# Patient Record
Sex: FEMALE | Race: WHITE | NOT HISPANIC OR LATINO | Employment: OTHER | URBAN - NONMETROPOLITAN AREA
[De-identification: names, ages, dates, MRNs, and addresses within clinical notes are randomized per-mention and may not be internally consistent; named-entity substitution may affect disease eponyms.]

---

## 2017-09-28 ENCOUNTER — OFFICE VISIT (OUTPATIENT)
Dept: CARDIOLOGY | Facility: PHYSICIAN GROUP | Age: 77
End: 2017-09-28
Payer: MEDICARE

## 2017-09-28 VITALS
BODY MASS INDEX: 25.95 KG/M2 | HEART RATE: 77 BPM | SYSTOLIC BLOOD PRESSURE: 108 MMHG | DIASTOLIC BLOOD PRESSURE: 74 MMHG | OXYGEN SATURATION: 97 % | HEIGHT: 62 IN | WEIGHT: 141 LBS

## 2017-09-28 DIAGNOSIS — E78.2 MIXED HYPERLIPIDEMIA: ICD-10-CM

## 2017-09-28 DIAGNOSIS — I48.20 CHRONIC ATRIAL FIBRILLATION (HCC): ICD-10-CM

## 2017-09-28 DIAGNOSIS — Z79.899 ENCOUNTER FOR LONG-TERM CURRENT USE OF HIGH RISK MEDICATION: ICD-10-CM

## 2017-09-28 DIAGNOSIS — R47.01 EXPRESSIVE APHASIA: ICD-10-CM

## 2017-09-28 DIAGNOSIS — I10 ESSENTIAL HYPERTENSION: ICD-10-CM

## 2017-09-28 DIAGNOSIS — C50.919 MALIGNANT NEOPLASM OF FEMALE BREAST, UNSPECIFIED LATERALITY, UNSPECIFIED SITE OF BREAST: ICD-10-CM

## 2017-09-28 DIAGNOSIS — I63.9 CEREBROVASCULAR ACCIDENT (CVA), UNSPECIFIED MECHANISM (HCC): ICD-10-CM

## 2017-09-28 DIAGNOSIS — Z79.01 ON CONTINUOUS ORAL ANTICOAGULATION: ICD-10-CM

## 2017-09-28 PROCEDURE — 99205 OFFICE O/P NEW HI 60 MIN: CPT | Performed by: INTERNAL MEDICINE

## 2017-09-28 RX ORDER — AMLODIPINE BESYLATE 10 MG/1
10 TABLET ORAL DAILY
COMMUNITY

## 2017-09-28 RX ORDER — WARFARIN SODIUM 2 MG/1
2 TABLET ORAL DAILY
COMMUNITY
End: 2017-10-10 | Stop reason: SDUPTHER

## 2017-09-28 RX ORDER — ATORVASTATIN CALCIUM 40 MG/1
40 TABLET, FILM COATED ORAL NIGHTLY
COMMUNITY

## 2017-09-28 NOTE — PROGRESS NOTES
Subjective:   So Carroll is a 76 y.o. female who presents today for initial consultation. Has atrial fibrillation s/p CVA on OAC with warfarin, breast cancer s/p right lumpectomy and pending radiation therapy, HTN, HLP, and expressive aphasia.    No symptoms or complaints. With her niece. Recently moved to [facility], has seen her new PCP but he did not initiate coumadin monitoring and she has not had a check in about a month. No echo available.    Denies any other cardiovascular symptoms including chest pain, shortness of breath, dyspnea on exertion, lightheadedness, syncope or presyncope, lower extremity edema, PND, orthopnea or palpitations.      Past Medical History:   Diagnosis Date   • Atrial fibrillation (CMS-HCC)    • Breast cancer (CMS-HCC)    • CVA (cerebral vascular accident) (CMS-HCC)    • Encounter for long-term current use of high risk medication    • Essential hypertension    • Expressive aphasia    • Hyperlipidemia    • On continuous oral anticoagulation      Past Surgical History:   Procedure Laterality Date   • LUMPECTOMY       Family History   Problem Relation Age of Onset   • Heart Disease Neg Hx      History   Smoking Status   • Former Smoker   Smokeless Tobacco   • Never Used     No Known Allergies  Outpatient Encounter Prescriptions as of 9/28/2017   Medication Sig Dispense Refill   • sertraline (ZOLOFT) 50 MG Tab Take 50 mg by mouth every day.     • Garlic 1000 MG Cap Take  by mouth.     • amlodipine (NORVASC) 10 MG Tab Take 10 mg by mouth every day.     • atorvastatin (LIPITOR) 40 MG Tab Take 40 mg by mouth every evening.     • warfarin (COUMADIN) 2 MG Tab Take 2 mg by mouth every day.     • Cholecalciferol (D3-1000) 1000 UNIT Cap Take  by mouth.     • metoprolol (LOPRESSOR) 25 MG Tab Take 1 Tab by mouth 2 times a day. 60 Tab 11     No facility-administered encounter medications on file as of 9/28/2017.      Review of Systems   All other systems reviewed and are negative.       Objective:  "  /74   Pulse 77   Ht 1.575 m (5' 2\")   Wt 64 kg (141 lb)   SpO2 97%   BMI 25.79 kg/m²     Physical Exam   Constitutional: She is oriented to person, place, and time. She appears well-developed and well-nourished. No distress.   HENT:   Head: Normocephalic and atraumatic.   Mouth/Throat: Oropharynx is clear and moist. No oropharyngeal exudate.   Eyes: Conjunctivae are normal. Pupils are equal, round, and reactive to light. No scleral icterus.   Neck: Normal range of motion. Neck supple. No JVD present. No thyromegaly present.   Cardiovascular: Normal rate, normal heart sounds and intact distal pulses.  An irregularly irregular rhythm present. Exam reveals no gallop and no friction rub.    No murmur heard.  Pulses:       Carotid pulses are 2+ on the right side, and 2+ on the left side.       Radial pulses are 2+ on the right side, and 2+ on the left side.        Popliteal pulses are 2+ on the right side, and 2+ on the left side.        Dorsalis pedis pulses are 2+ on the right side, and 2+ on the left side.        Posterior tibial pulses are 2+ on the right side, and 2+ on the left side.   Pulmonary/Chest: Effort normal and breath sounds normal. She has no wheezes. She has no rales.   Abdominal: Soft. Bowel sounds are normal. She exhibits no distension. There is no tenderness.   Musculoskeletal: She exhibits no edema or tenderness.   Neurological: She is alert and oriented to person, place, and time. No cranial nerve deficit (Cranial nerves II through XII grossly intact).   Expressive aphasia   Skin: Skin is warm and dry. No rash noted. She is not diaphoretic. No erythema.   Psychiatric: She has a normal mood and affect. Her behavior is normal.   Vitals reviewed.    EKG (9/28/2017):  I have personally reviewed the EKG this visit and discussed with the patient. It shows AF91.    Assessment:     1. Chronic atrial fibrillation (CMS-HCC)  metoprolol (LOPRESSOR) 25 MG Tab    REFERRAL TO ANTICOAGULATION " MONITORING    ECHOCARDIOGRAM COMP W/O CONT   2. Cerebrovascular accident (CVA), unspecified mechanism (CMS-HCC)     3. On continuous oral anticoagulation     4. Malignant neoplasm of female breast, unspecified laterality, unspecified site of breast (CMS-HCC)     5. Essential hypertension     6. Mixed hyperlipidemia     7. Encounter for long-term current use of high risk medication     8. Expressive aphasia         Medical Decision Making:  Today's Assessment / Status / Plan:     Doing well. No symptoms. CVA and AF, relatively rate controlled. Recommend BB and Echocardiogram.    1. Echo.  2. Lopressor 25mg BID.  3. Coumadin clinic referral and INR today.    FU one year or sooner if testing abnormal.

## 2017-10-02 ENCOUNTER — ANTICOAGULATION MONITORING (OUTPATIENT)
Dept: VASCULAR LAB | Facility: MEDICAL CENTER | Age: 77
End: 2017-10-02

## 2017-10-02 DIAGNOSIS — I48.20 CHRONIC ATRIAL FIBRILLATION (HCC): ICD-10-CM

## 2017-10-02 DIAGNOSIS — Z79.01 ON CONTINUOUS ORAL ANTICOAGULATION: ICD-10-CM

## 2017-10-02 DIAGNOSIS — I63.9 CEREBROVASCULAR ACCIDENT (CVA), UNSPECIFIED MECHANISM (HCC): ICD-10-CM

## 2017-10-02 LAB — INR PPP: 2.5 (ref 2–3.5)

## 2017-10-02 NOTE — PROGRESS NOTES
Unable to dose pt today. LM on VM to call back for instructions and to let us know what dose she is taking. Her med rec says 2 mg but not sure if she is taking that daily or a different dose.  Provided a phone number for the clinic.   CHARLENE Morelos.

## 2017-10-03 ENCOUNTER — ANTICOAGULATION MONITORING (OUTPATIENT)
Dept: VASCULAR LAB | Facility: MEDICAL CENTER | Age: 77
End: 2017-10-03

## 2017-10-03 VITALS — HEART RATE: 73 BPM | SYSTOLIC BLOOD PRESSURE: 100 MMHG | OXYGEN SATURATION: 94 % | DIASTOLIC BLOOD PRESSURE: 64 MMHG

## 2017-10-03 DIAGNOSIS — Z79.01 ON CONTINUOUS ORAL ANTICOAGULATION: ICD-10-CM

## 2017-10-03 DIAGNOSIS — I48.20 CHRONIC ATRIAL FIBRILLATION (HCC): ICD-10-CM

## 2017-10-03 DIAGNOSIS — I63.9 CEREBROVASCULAR ACCIDENT (CVA), UNSPECIFIED MECHANISM (HCC): ICD-10-CM

## 2017-10-03 LAB — INR PPP: 3.3 (ref 2–3.5)

## 2017-10-03 NOTE — PROGRESS NOTES
OP Anticoagulation Service Note    Date: 10/3/2017  Blood Pressure : 100/64  Pulse: 73    Anticoagulation Summary  As of 10/3/2017    INR goal:   2.0-3.0   TTR:   --   Today's INR:   3.3!   Maintenance plan:   3 mg (2 mg x 1.5) on Mon, Wed, Fri; 2 mg (2 mg x 1) all other days   Weekly total:   17 mg   Plan last modified:   TARIQ Morelos (10/3/2017)   Next INR check:   10/10/2017   Target end date:   Indefinite    Indications    Atrial fibrillation (CMS-[facility]) [I48.91]  Stroke (CMS-[facility]) [I63.9] [I63.9]  On continuous oral anticoagulation [Z79.01]             Anticoagulation Episode Summary     INR check location:       Preferred lab:       Send INR reminders to:       Comments:         Anticoagulation Care Providers     Provider Role Specialty Phone number    Gio Garcia M.D. Referring Interventional Cardiology [phone]    Bradley Ames M.D. Responsible Internal Medicine [phone]    [facility] Anticoagulation Services Responsible  [phone]        Anticoagulation Patient Findings      THIS VISIT CONDUCTED WITH PRESENTER VIA TELEMEDICINE UTILIZING SECURE AND ENCRYPTED VIDEOCONFERENCING EQUIPMENT  ROS:    Pulm: Denies SOB, chest pain.    Card: Denies syncope, edema, palpitations.    Extremities: Denies redness, pain.    PE:    Pulm: No SOB, even and unlabored.    Card: Normal rate and rhythm.   Extremities: No redness, or edema.     INR  supra-therapeutic.    Denies signs/symptoms of bleeding and/or thrombosis.    Denies changes to diet or medications.   Follow up appt in 1 weeks     Plan:  Take 1/2 tablet tonight then back to your usual dose.     Medication: Warfarin (Coumadin)     Sunday Monday Tuesday Wednesday Thursday Friday Saturday      2 mg    3 mg    2 mg    3 mg    2 mg    3 mg    2 mg      1 tab(s)    1.5 tab(s)    1 tab(s)    1.5 tab(s)    1 tab(s)    1.5 tab(s)  1 tab(s)     Next Appointment: Tuesday, October 10 @3:00    Review all of your home medications and newly  ordered medications with your doctor and / or pharmacist. Follow medication instructions as directed by your doctor and / or pharmacist. Please keep your medication list with you and share with your physician. Update the information when medications are discontinued, doses are changed, or new medications (including over-the-counter products) are added; and carry medication information at all times in the event of emergency situations.      For questions, please contact Outpatient Anticoagulation Service [phone].     CHARLENE Morelos.

## 2017-10-06 ENCOUNTER — OFFICE VISIT (OUTPATIENT)
Dept: NEUROLOGY | Facility: MEDICAL CENTER | Age: 77
End: 2017-10-06
Payer: MEDICARE

## 2017-10-06 VITALS
DIASTOLIC BLOOD PRESSURE: 58 MMHG | HEIGHT: 62 IN | RESPIRATION RATE: 16 BRPM | WEIGHT: 141.7 LBS | TEMPERATURE: 97.9 F | HEART RATE: 59 BPM | OXYGEN SATURATION: 91 % | BODY MASS INDEX: 26.07 KG/M2 | SYSTOLIC BLOOD PRESSURE: 100 MMHG

## 2017-10-06 DIAGNOSIS — I63.00 CEREBROVASCULAR ACCIDENT (CVA) DUE TO THROMBOSIS OF PRECEREBRAL ARTERY (HCC): ICD-10-CM

## 2017-10-06 DIAGNOSIS — I48.91 ATRIAL FIBRILLATION, UNSPECIFIED TYPE (HCC): ICD-10-CM

## 2017-10-06 PROCEDURE — 99204 OFFICE O/P NEW MOD 45 MIN: CPT | Performed by: PSYCHIATRY & NEUROLOGY

## 2017-10-06 ASSESSMENT — PATIENT HEALTH QUESTIONNAIRE - PHQ9: CLINICAL INTERPRETATION OF PHQ2 SCORE: 0

## 2017-10-06 NOTE — PROGRESS NOTES
NEUROLOGY NOTE    Referring Physician  Bradley Ames M.D.      CHIEF COMPLAINT:  2 years ago, ICH ( brain aneurysm?) in [place]  Hx of breast carcinoma  Hx of atrial  Chief Complaint   Patient presents with   • Establish Care     TIA       PRESENT ILLNESS:   The patient was referred to  [facility] with Hx of possible stroke in [place]    She just moved into     PAST MEDICAL HISTORY:  Past Medical History:   Diagnosis Date   • Atrial fibrillation (CMS-HCC)    • Breast cancer (CMS-HCC)    • CVA (cerebral vascular accident) (CMS-HCC)    • Encounter for long-term current use of high risk medication    • Essential hypertension    • Expressive aphasia    • Hyperlipidemia    • On continuous oral anticoagulation        PAST SURGICAL HISTORY:  Past Surgical History:   Procedure Laterality Date   • LUMPECTOMY         FAMILY HISTORY:  Family History   Problem Relation Age of Onset   • Heart Disease Neg Hx        SOCIAL HISTORY:  Social History     Social History   • Marital status: Unknown     Spouse name: N/A   • Number of children: N/A   • Years of education: N/A     Occupational History   • Not on file.     Social History Main Topics   • Smoking status: Former Smoker   • Smokeless tobacco: Never Used   • Alcohol use No   • Drug use: No   • Sexual activity: Not on file     Other Topics Concern   • Not on file     Social History Narrative   • No narrative on file     ALLERGIES:  No Known Allergies  TOBHX  History   Smoking Status   • Former Smoker   Smokeless Tobacco   • Never Used     ALCHX  History   Alcohol Use No     DRUGHX  History   Drug Use No           MEDICATIONS:  Current Outpatient Prescriptions   Medication   • sertraline (ZOLOFT) 50 MG Tab   • Garlic 1000 MG Cap   • amlodipine (NORVASC) 10 MG Tab   • atorvastatin (LIPITOR) 40 MG Tab   • warfarin (COUMADIN) 2 MG Tab   • Cholecalciferol (D3-1000) 1000 UNIT Cap   • metoprolol (LOPRESSOR) 25 MG Tab     No current facility-administered medications for this visit.   "      REVIEW OF SYSTEM:    Constitutional: Denies fevers, Denies weight changes   Eyes: Denies changes in vision, no eye pain   Ears/Nose/Throat/Mouth: Denies nasal congestion or sore throat   Cardiovascular: atrial fib   Respiratory: Denies SOB.   Gastrointestinal/Hepatic: Denies abdominal pain, nausea, vomiting, diarrhea, constipation or GI bleeding   Genitourinary: Denies bladder dysfunction, dysuria or frequency   Musculoskeletal/Rheum: Denies joint pain and swelling   Skin/Breast: Denies rash, denies breast lumps or discharge   Neurological: aphasia,   Psychiatric: denies mood disorder   Endocrine: denies hx of diabetes or thyroid dysfunction   Heme/Oncology/Lymph Nodes: Denies enlarged lymph nodes, denies brusing or known bleeding disorder   Allergic/Immunologic: Denies hx of allergies         PHYSICAL AND NEUROLOGICAL EXMAINATIONS:  VITAL SIGNS: /58   Pulse (!) 59   Temp 36.6 °C (97.9 °F)   Resp 16   Ht 1.575 m (5' 2\")   Wt 64.3 kg (141 lb 11.2 oz)   SpO2 91%   BMI 25.92 kg/m²   CURRENT WEIGHT:   BMI: Body mass index is 25.92 kg/m².  PREVIOUS WEIGHTS:  Wt Readings from Last 25 Encounters:   10/06/17 64.3 kg (141 lb 11.2 oz)   09/28/17 64 kg (141 lb)       General appearance of patient: WDWN(+) NAD(+)    EYES  o Fundus : Papilledem(-) Exudates(-) Hemorrhage(-)  Nervous System  Orientation to time, place and person(-)  Memory normal(-)  Language: aphasia(+)-- comprehension, expression and repetition all impaired, more impaired in comprehension  Knowledge: past(-) Current(-)  Attention(+)  Cranial Nerves  • Nerve 2: intact  • Nerve 3,4,6: intact  • Nerve 5 : intact  • Nerve 7: intact  • Nerve 8: intact  • Nerve 9 & 10: intact  • Nerve 11: intact  • Nerve 12: intact  Muscle Power and muscle tone: symmetric, normal in upper and lower  Sensory System: Pin sensation intact(+)  Reflexes: symmetric increased in the legs R>L  Cerebellar Function FNP normal   Gait : Steady(+) TandemGait steady(+)  Heart and " Vascular  Peripheral Vasucular system : Edema (-) Swelling(-)  RHB, Breathing sound clear  abdomen bowel sound normoactive  Extremities freely moveable  Joints no contracture       LAB:            IMPRESSION:    1. Aphasia for 2 years-- extensive embolic stroke?-- happened 2 years ago in [place]  2. Hx of atrial fib, breast carcinoma      PLAN/RECOMMENDATIONS:    Will get MRI of brain  Explain to the patient and her family member that we will need imaging test first       SIGNATURE:  Leonides Dove    CC:  Bradley Ames M.D.

## 2017-10-06 NOTE — PATIENT INSTRUCTIONS
IMPRESSION:    1. Aphasia for 2 years-- extensive embolic stroke?-- happened 2 years ago in [place]  2. Hx of atrial fib, breast carcinoma      PLAN/RECOMMENDATIONS:    Will get MRI of brain  Explain to the patient and her family member that we will need imaging test first       SIGNATURE:  Leonides Dove    CC:  Bradley Ames M.D.

## 2017-10-10 ENCOUNTER — APPOINTMENT (OUTPATIENT)
Dept: VASCULAR LAB | Facility: MEDICAL CENTER | Age: 77
End: 2017-10-10
Payer: MEDICARE

## 2017-10-10 ENCOUNTER — HOSPITAL ENCOUNTER (OUTPATIENT)
Dept: RADIOLOGY | Facility: MEDICAL CENTER | Age: 77
End: 2017-10-10
Attending: PSYCHIATRY & NEUROLOGY
Payer: MEDICARE

## 2017-10-10 ENCOUNTER — ANTICOAGULATION MONITORING (OUTPATIENT)
Dept: VASCULAR LAB | Facility: MEDICAL CENTER | Age: 77
End: 2017-10-10

## 2017-10-10 VITALS — HEART RATE: 69 BPM | OXYGEN SATURATION: 95 % | SYSTOLIC BLOOD PRESSURE: 92 MMHG | DIASTOLIC BLOOD PRESSURE: 70 MMHG

## 2017-10-10 DIAGNOSIS — I48.91 ATRIAL FIBRILLATION, UNSPECIFIED TYPE (HCC): ICD-10-CM

## 2017-10-10 DIAGNOSIS — I48.20 CHRONIC ATRIAL FIBRILLATION (HCC): ICD-10-CM

## 2017-10-10 DIAGNOSIS — I63.9 CEREBROVASCULAR ACCIDENT (CVA), UNSPECIFIED MECHANISM (HCC): ICD-10-CM

## 2017-10-10 DIAGNOSIS — I63.00 CEREBROVASCULAR ACCIDENT (CVA) DUE TO THROMBOSIS OF PRECEREBRAL ARTERY (HCC): ICD-10-CM

## 2017-10-10 DIAGNOSIS — Z79.01 ON CONTINUOUS ORAL ANTICOAGULATION: ICD-10-CM

## 2017-10-10 LAB
INR PPP: 3 (ref 2–3.5)
INR PPP: 4.6 (ref 2–3.5)

## 2017-10-10 PROCEDURE — 70551 MRI BRAIN STEM W/O DYE: CPT

## 2017-10-10 RX ORDER — WARFARIN SODIUM 2 MG/1
TABLET ORAL
Qty: 45 TAB | Refills: 11 | Status: SHIPPED | OUTPATIENT
Start: 2017-10-10

## 2017-10-10 NOTE — PROGRESS NOTES
OP Anticoagulation Service Note    Date: 10/10/2017  Blood Pressure : 92/70  Pulse:69    Anticoagulation Summary  As of 10/10/2017    INR goal:   2.0-3.0   TTR:   --   Today's INR:   3.0   Maintenance plan:   3 mg (2 mg x 1.5) on Mon, Fri; 2 mg (2 mg x 1) all other days   Weekly total:   16 mg   Plan last modified:   TARIQ Morelos (10/10/2017)   Next INR check:   10/17/2017   Target end date:   Indefinite    Indications    Atrial fibrillation (CMS-[facility]) [I48.91]  Stroke (CMS-[facility]) [I63.9] [I63.9]  On continuous oral anticoagulation [Z79.01]             Anticoagulation Episode Summary     INR check location:       Preferred lab:       Send INR reminders to:       Comments:         Anticoagulation Care Providers     Provider Role Specialty Phone number    Gio Garcia M.D. Referring Interventional Cardiology [phone]    Bradley Ames M.D. Responsible Internal Medicine [phone]    [facility] Anticoagulation Services Responsible  [phone]        Anticoagulation Patient Findings      THIS VISIT CONDUCTED WITH PRESENTER VIA TELEMEDICINE UTILIZING SECURE AND ENCRYPTED VIDEOCONFERENCING EQUIPMENT  ROS:    Pulm: Denies SOB, chest pain.    Card: Denies syncope, edema, palpitations.    Extremities: Denies redness, pain.    PE:    Pulm: No SOB, even and unlabored.    Card: Normal rate and rhythm.   Extremities: No redness, or edema.     INR  supra-therapeutic.    Denies signs/symptoms of bleeding and/or thrombosis.    Denies changes to diet or medications.   Follow up appt in 1 weeks     Plan:  Take 3 mg on Monday and Thursday and 2 mg the rest of the week.      Medication: Warfarin (Coumadin)     Sunday Monday Tuesday Wednesday Thursday Friday Saturday      2 mg    3 mg    2 mg    2 mg   3 mg    2 mg    2 mg      1 tab(s)    1.5 tab(s)    1 tab(s)    1 tab(s)    1.5 tab(s)    1 tab(s)  1 tab(s)     Next Appointment: Tuesday,Oct 17 @ 4:45  Review all of your home medications and newly  ordered medications with your doctor and / or pharmacist. Follow medication instructions as directed by your doctor and / or pharmacist. Please keep your medication list with you and share with your physician. Update the information when medications are discontinued, doses are changed, or new medications (including over-the-counter products) are added; and carry medication information at all times in the event of emergency situations.      For questions, please contact Outpatient Anticoagulation Service [phone].     CHARLENE Morelos.

## 2017-10-12 ENCOUNTER — TELEPHONE (OUTPATIENT)
Dept: CARDIOLOGY | Facility: MEDICAL CENTER | Age: 77
End: 2017-10-12

## 2017-10-12 NOTE — TELEPHONE ENCOUNTER
Spoke with pt's Niece (Belen), per Belen she is authorized to received information and she is also listed as  on pt's chart, discussed Echo results per TW, Belen verbalizes understanding

## 2017-10-12 NOTE — TELEPHONE ENCOUNTER
----- Message from Gio Garcia M.D. sent at 10/11/2017  8:35 AM PDT -----  Echocardiogram looks excellent. No changes recommended.  Please let them know.

## 2017-12-12 ENCOUNTER — APPOINTMENT (OUTPATIENT)
Dept: VASCULAR LAB | Facility: MEDICAL CENTER | Age: 77
End: 2017-12-12
Payer: MEDICARE

## 2017-12-12 ENCOUNTER — ANTICOAGULATION MONITORING (OUTPATIENT)
Dept: VASCULAR LAB | Facility: MEDICAL CENTER | Age: 77
End: 2017-12-12
Payer: MEDICARE

## 2017-12-12 ENCOUNTER — NON-PROVIDER VISIT (OUTPATIENT)
Dept: MEDICAL GROUP | Facility: PHYSICIAN GROUP | Age: 77
End: 2017-12-12
Payer: MEDICARE

## 2017-12-12 VITALS
SYSTOLIC BLOOD PRESSURE: 96 MMHG | HEART RATE: 69 BPM | OXYGEN SATURATION: 96 % | RESPIRATION RATE: 16 BRPM | DIASTOLIC BLOOD PRESSURE: 60 MMHG

## 2017-12-12 DIAGNOSIS — I48.91 ATRIAL FIBRILLATION, UNSPECIFIED TYPE (HCC): ICD-10-CM

## 2017-12-12 DIAGNOSIS — I63.89 CEREBROVASCULAR ACCIDENT (CVA) DUE TO OTHER MECHANISM (HCC): ICD-10-CM

## 2017-12-12 DIAGNOSIS — Z79.01 ON CONTINUOUS ORAL ANTICOAGULATION: ICD-10-CM

## 2017-12-12 DIAGNOSIS — D68.9 COAGULATION DISORDER (HCC): ICD-10-CM

## 2017-12-12 LAB
INR BLD: 6.9 (ref 0.9–1.2)
INR PPP: 6.9 (ref 2–3.5)
POC PROTIME: ABNORMAL

## 2017-12-12 PROCEDURE — 99212 OFFICE O/P EST SF 10 MIN: CPT | Mod: GT | Performed by: NURSE PRACTITIONER

## 2017-12-12 PROCEDURE — 85610 PROTHROMBIN TIME: CPT | Performed by: PHYSICIAN ASSISTANT

## 2017-12-12 NOTE — PROGRESS NOTES
OP Anticoagulation Service Note    Date: 12/12/2017  Blood Pressure : (!) 96/60  Pulse: 69  Respiration: 16    Anticoagulation Summary  As of 12/12/2017    INR goal:   2.0-3.0   TTR:   0.0 % (2 mo)   Today's INR:   6.9!   Maintenance plan:   3 mg (2 mg x 1.5) on Mon, Fri; 2 mg (2 mg x 1) all other days   Weekly total:   16 mg   Plan last modified:   TARIQ Morelos (10/10/2017)   Next INR check:      Target end date:   Indefinite    Indications    Atrial fibrillation (CMS-[facility]) [I48.91]  Stroke (CMS-[facility]) [I63.9] [I63.9]  On continuous oral anticoagulation [Z79.01]             Anticoagulation Episode Summary     INR check location:       Preferred lab:       Send INR reminders to:       Comments:         Anticoagulation Care Providers     Provider Role Specialty Phone number    Gio Garcia M.D. Referring Interventional Cardiology [phone]    Bradley Ames M.D. Responsible Internal Medicine [phone]    [facility] Anticoagulation Services Responsible  [phone]        Anticoagulation Patient Findings      THIS VISIT CONDUCTED WITH PRESENTER VIA TELEMEDICINE UTILIZING SECURE AND ENCRYPTED VIDEOCONFERENCING EQUIPMENT  ROS:    Pulm: Denies SOB, chest pain.    Card: Denies syncope, edema, palpitations.    Extremities: Denies redness, pain.    PE:    Pulm: No SOB, even and unlabored.    Card: Normal rate and rhythm.   Extremities: No redness, or edema.     INR  supra-therapeutic.    Denies signs/symptoms of bleeding and/or thrombosis.    Denies changes to diet or medications.   Follow up appt in 1 weeks     Plan:  Hold dose tonight and tomorrow night    Medication: Warfarin (Coumadin)     Sunday Monday Tuesday Wednesday Thursday Friday Saturday      2 mg    3 mg    0 mg    0 mg    2 mg    2 mg    2 mg      1 tab(s)    1.5 tab(s)    0 tab(s)    0 tab(s)    1 tab(s)    1 tab(s)  1 tab(s)     Next Appointment: Tuesday, Dec 19 @ 2:15    Review all of your home medications and newly ordered  medications with your doctor and / or pharmacist. Follow medication instructions as directed by your doctor and / or pharmacist. Please keep your medication list with you and share with your physician. Update the information when medications are discontinued, doses are changed, or new medications (including over-the-counter products) are added; and carry medication information at all times in the event of emergency situations.      For questions, please contact Outpatient Anticoagulation Service [phone].       The patient is on a high risk medication and is supra-therapeudic. This increases the patients risk of bleeding and therefore requires frequent monitoring and follow up.     The patient will go to the ER for falls with a head injury,  blood in urine or stool or any bleeding that last longer than 20 min.          CHARLENE Morelos.

## 2018-08-22 ENCOUNTER — ANTICOAGULATION MONITORING (OUTPATIENT)
Dept: VASCULAR LAB | Facility: MEDICAL CENTER | Age: 78
End: 2018-08-22

## 2018-08-22 DIAGNOSIS — I48.91 ATRIAL FIBRILLATION, UNSPECIFIED TYPE (HCC): ICD-10-CM

## 2018-08-22 DIAGNOSIS — I63.89 CEREBROVASCULAR ACCIDENT (CVA) DUE TO OTHER MECHANISM (HCC): ICD-10-CM

## 2018-08-22 DIAGNOSIS — Z79.01 ON CONTINUOUS ORAL ANTICOAGULATION: ICD-10-CM

## 2018-08-22 LAB — INR PPP: 1.9 (ref 2–3.5)

## 2018-08-23 NOTE — PROGRESS NOTES
Received INR results of 1.9 from Dayne .  Have not had contact with patient since 12-.  LM for patient to contact clinic to discuss her current state and warfarin before making any recommendations.  Zheng France, AngelinaD

## 2018-08-23 NOTE — PROGRESS NOTES
Anticoagulation Summary  As of 8/22/2018    INR goal:   2.0-3.0   TTR:   16.1 % (10.5 mo)   Today's INR:   1.9!   Warfarin maintenance plan:   2 mg (2 mg x 1) on Mon, Thu; 1 mg (2 mg x 0.5) all other days   Weekly warfarin total:   9 mg   Plan last modified:   Nikko Moran PharmD (8/23/2018)   Next INR check:   8/27/2018   Target end date:   Indefinite    Indications    Atrial fibrillation (HCC) [I48.91]  Stroke (CMS-HCC) [I63.9] [I63.9]  On continuous oral anticoagulation [Z79.01]             Anticoagulation Episode Summary     INR check location:       Preferred lab:       Send INR reminders to:       Comments:         Anticoagulation Care Providers     Provider Role Specialty Phone number    Gio Garcia M.D. Referring Interventional Cardiology [phone]    Bradley Ames M.D. Responsible Internal Medicine [phone]    [facility] Anticoagulation Services Responsible  [phone]        Anticoagulation Patient Findings    Received message from Day[facility] NICHOLAS pt is taking the warfarin as shown above, dosing calendar was updated.  Still unable to reach pt but left a voicemail.  Left voicemail message to report a SUB therapeutic INR of 1.9.  Pt to bolus today then continue with current warfarin dosing regimen. Requested pt contact the clinic for any s/s of unusual bleeding, bruising, clotting or any changes to diet or medication. FU 4 days.     Nikko K. Dent, PharmD   [facility]

## 2018-08-29 ENCOUNTER — ANTICOAGULATION MONITORING (OUTPATIENT)
Dept: VASCULAR LAB | Facility: MEDICAL CENTER | Age: 78
End: 2018-08-29

## 2018-08-29 DIAGNOSIS — Z79.01 ON CONTINUOUS ORAL ANTICOAGULATION: ICD-10-CM

## 2018-08-29 LAB — INR PPP: 1.5 (ref 2–3.5)

## 2018-08-29 NOTE — PROGRESS NOTES
Anticoagulation Summary  As of 8/29/2018    INR goal:   2.0-3.0   TTR:   15.8 % (10.7 mo)   Today's INR:   1.5!   Warfarin maintenance plan:   2 mg (2 mg x 1) on Mon, Thu; 1 mg (2 mg x 0.5) all other days   Weekly warfarin total:   9 mg   Plan last modified:   Angelina MaiD (8/23/2018)   Next INR check:   8/31/2018   Target end date:   Indefinite    Indications    Atrial fibrillation (HCC) [I48.91]  Stroke (CMS-HCC) [I63.9] [I63.9]  On continuous oral anticoagulation [Z79.01]             Anticoagulation Episode Summary     INR check location:       Preferred lab:       Send INR reminders to:       Comments:         Anticoagulation Care Providers     Provider Role Specialty Phone number    Gio Garcia M.D. Referring Interventional Cardiology [phone]    Bradley Ames M.D. Responsible Internal Medicine [phone]    [facility] Anticoagulation Services Responsible  [phone]        Anticoagulation Patient Findings      Left voicemail message to report a SUB therapeutic INR of 1.5.    Will have pt a boost dose of 2mg dose of warfarin today and then 3mg boost dose of warfarin tomorrow.    Pt to continue with current warfarin dosing regimen. Requested pt contact the clinic for any s/s of unusual bleeding, bruising, clotting or any changes to diet or medication.    FU INR in 2 days with Dayne Reyes, PharmD

## 2018-08-31 ENCOUNTER — ANTICOAGULATION MONITORING (OUTPATIENT)
Dept: VASCULAR LAB | Facility: MEDICAL CENTER | Age: 78
End: 2018-08-31

## 2018-08-31 DIAGNOSIS — Z79.01 ON CONTINUOUS ORAL ANTICOAGULATION: ICD-10-CM

## 2018-08-31 LAB — INR PPP: 1.9 (ref 2–3.5)

## 2018-08-31 NOTE — PROGRESS NOTES
Anticoagulation Summary  As of 8/31/2018    INR goal:   2.0-3.0   TTR:   15.7 % (10.8 mo)   Today's INR:   1.9!   Warfarin maintenance plan:   2 mg (2 mg x 1) every day   Weekly warfarin total:   14 mg   Plan last modified:   Nikko Moran PharmD (8/31/2018)   Next INR check:   9/4/2018   Target end date:   Indefinite    Indications    Atrial fibrillation (HCC) [I48.91]  Stroke (CMS-HCC) [I63.9] [I63.9]  On continuous oral anticoagulation [Z79.01]             Anticoagulation Episode Summary     INR check location:       Preferred lab:       Send INR reminders to:       Comments:   [facility] [phone]      Anticoagulation Care Providers     Provider Role Specialty Phone number    Gio Garcia M.D. Referring Interventional Cardiology [phone]    Bradley Ames M.D. Responsible Internal Medicine [phone]    [facility] Anticoagulation Services Responsible  [phone]        Anticoagulation Patient Findings    Left voicemail message to report a SUB therapeutic INR of 1.9.  Pt to begin increased warfarin dosing regimen. Requested pt contact the clinic for any s/s of unusual bleeding, bruising, clotting or any changes to diet or medication. FU 4 days.     Nikko Moran, PharmD   Faxed to [facility]

## 2018-09-05 ENCOUNTER — ANTICOAGULATION MONITORING (OUTPATIENT)
Dept: VASCULAR LAB | Facility: MEDICAL CENTER | Age: 78
End: 2018-09-05

## 2018-09-05 DIAGNOSIS — Z79.01 ON CONTINUOUS ORAL ANTICOAGULATION: ICD-10-CM

## 2018-09-05 LAB — INR PPP: 1.7 (ref 2–3.5)

## 2018-09-06 NOTE — PROGRESS NOTES
Anticoagulation Summary  As of 9/5/2018    INR goal:   2.0-3.0   TTR:   15.4 % (10.9 mo)   Today's INR:   1.7!   Warfarin maintenance plan:   3 mg (2 mg x 1.5) on Wed, Sat; 2 mg (2 mg x 1) all other days   Weekly warfarin total:   16 mg   Plan last modified:   Mariajose Reyes, PharmD (9/5/2018)   Next INR check:   9/10/2018   Target end date:   Indefinite    Indications    Atrial fibrillation (HCC) [I48.91]  Stroke (CMS-HCC) [I63.9] [I63.9]  On continuous oral anticoagulation [Z79.01]             Anticoagulation Episode Summary     INR check location:       Preferred lab:       Send INR reminders to:       Comments:   [facility] [phone]      Anticoagulation Care Providers     Provider Role Specialty Phone number    Gio Garcia M.D. Referring Interventional Cardiology [phone]    Bradley Ames M.D. Responsible Internal Medicine [phone]    [facility] Anticoagulation Services Responsible  [phone]        Anticoagulation Patient Findings      Left voicemail message to report a SUB therapeutic INR of 1.7.    Will have pt start an increased warfarin dosing regimen. Requested pt contact the clinic for any s/s of unusual bleeding, bruising, clotting or any changes to diet or medication.    FU INR in 1 week(s) with Dayne NICHOLAS.    Mariajose Reyes, PharmD

## 2018-09-12 ENCOUNTER — ANTICOAGULATION MONITORING (OUTPATIENT)
Dept: VASCULAR LAB | Facility: MEDICAL CENTER | Age: 78
End: 2018-09-12

## 2018-09-12 DIAGNOSIS — Z79.01 ON CONTINUOUS ORAL ANTICOAGULATION: ICD-10-CM

## 2018-09-12 LAB — INR PPP: 2.6 (ref 2–3.5)

## 2018-09-12 NOTE — PROGRESS NOTES
Anticoagulation Summary  As of 9/12/2018    INR goal:   2.0-3.0   TTR:   16.5 % (11.2 mo)   Today's INR:   2.6   Warfarin maintenance plan:   3 mg (2 mg x 1.5) on Wed, Sat; 2 mg (2 mg x 1) all other days   Weekly warfarin total:   16 mg   No change documented:   Analisa Pollock, Pharmacy Intern   Plan last modified:   Mariajose Reyes, PharmD (9/5/2018)   Next INR check:   9/19/2018   Target end date:   Indefinite    Indications    Atrial fibrillation (HCC) [I48.91]  Stroke (CMS-HCC) [I63.9] [I63.9]  On continuous oral anticoagulation [Z79.01]             Anticoagulation Episode Summary     INR check location:       Preferred lab:       Send INR reminders to:       Comments:   Dayne [phone]      Anticoagulation Care Providers     Provider Role Specialty Phone number    Gio Garcia M.D. Referring Interventional Cardiology [phone]    Bradley Ames M.D. Responsible Internal Medicine [phone]    [facility] Anticoagulation Services Responsible  [phone]        Anticoagulation Patient Findings    Left voicemail message for caregiver regarding therapeutic INR of 2.6.  Instructed caregiver to call if pt is experiencing any s/s of bleeding or if any changes are made to medications or diet.  Pt to continue current dosing regimen as listed.  Next INR scheduled 9/19/18 with Dayne Pollock, Pharmacy Intern    09/12/18  Cosignature - Mariajose Reyes, PharmD

## 2018-09-19 ENCOUNTER — ANTICOAGULATION MONITORING (OUTPATIENT)
Dept: VASCULAR LAB | Facility: MEDICAL CENTER | Age: 78
End: 2018-09-19

## 2018-09-19 DIAGNOSIS — I48.91 ATRIAL FIBRILLATION, UNSPECIFIED TYPE (HCC): ICD-10-CM

## 2018-09-19 DIAGNOSIS — Z79.01 ON CONTINUOUS ORAL ANTICOAGULATION: ICD-10-CM

## 2018-09-19 DIAGNOSIS — I63.9 CEREBROVASCULAR ACCIDENT (CVA), UNSPECIFIED MECHANISM (HCC): ICD-10-CM

## 2018-09-19 LAB — INR PPP: 3.3 (ref 2–3.5)

## 2018-09-19 NOTE — PROGRESS NOTES
Anticoagulation Summary  As of 9/19/2018    INR goal:   2.0-3.0   TTR:   17.3 % (11.4 mo)   Today's INR:   3.3!   Warfarin maintenance plan:   3 mg (2 mg x 1.5) on Wed, Sat; 2 mg (2 mg x 1) all other days   Weekly warfarin total:   16 mg   Plan last modified:   Mariajose Reyes, PharmD (9/5/2018)   Next INR check:   9/26/2018   Target end date:   Indefinite    Indications    Atrial fibrillation (HCC) [I48.91]  Stroke (CMS-HCC) [I63.9] [I63.9]  On continuous oral anticoagulation [Z79.01]             Anticoagulation Episode Summary     INR check location:       Preferred lab:       Send INR reminders to:       Comments:   Dayne [phone]      Anticoagulation Care Providers     Provider Role Specialty Phone number    Gio Garcia M.D. Referring Interventional Cardiology [phone]    Bradley Ames M.D. Responsible Internal Medicine [phone]    [facility] Anticoagulation Services Responsible  [phone]        Anticoagulation Patient Findings    Left voicemail message to report a supratherapeutic INR of 3.3.  Requested pt contact the clinic for any s/s of unusual bleeding, bruising, clotting or any changes to diet or medication.   Instructed patient to decrease today's dose to 2mg, then resume current warfarin regimen.  FU 1 weeks.  Zheng France, AngelinaD

## 2018-09-26 ENCOUNTER — ANTICOAGULATION MONITORING (OUTPATIENT)
Dept: VASCULAR LAB | Facility: MEDICAL CENTER | Age: 78
End: 2018-09-26

## 2018-09-26 DIAGNOSIS — Z79.01 ON CONTINUOUS ORAL ANTICOAGULATION: ICD-10-CM

## 2018-09-26 DIAGNOSIS — I48.91 ATRIAL FIBRILLATION, UNSPECIFIED TYPE (HCC): ICD-10-CM

## 2018-09-26 DIAGNOSIS — I63.9 CEREBROVASCULAR ACCIDENT (CVA), UNSPECIFIED MECHANISM (HCC): ICD-10-CM

## 2018-09-26 LAB — INR PPP: 3.6 (ref 2–3.5)

## 2018-09-26 NOTE — PROGRESS NOTES
Anticoagulation Summary  As of 9/26/2018    INR goal:   2.0-3.0   TTR:   17.0 % (11.6 mo)   Today's INR:   3.6!   Warfarin maintenance plan:   3 mg (2 mg x 1.5) on Wed; 2 mg (2 mg x 1) all other days   Weekly warfarin total:   15 mg   Plan last modified:   Zheng France PharmD (9/26/2018)   Next INR check:   10/3/2018   Target end date:   Indefinite    Indications    Atrial fibrillation (HCC) [I48.91]  Stroke (CMS-HCC) [I63.9] [I63.9]  On continuous oral anticoagulation [Z79.01]             Anticoagulation Episode Summary     INR check location:       Preferred lab:       Send INR reminders to:       Comments:   Dayne [phone]      Anticoagulation Care Providers     Provider Role Specialty Phone number    Gio Garcia M.D. Referring Interventional Cardiology [phone]    Bradley Ames M.D. Responsible Internal Medicine [phone]    [facility] Anticoagulation Services Responsible  [phone]        Anticoagulation Patient Findings  Patient Findings     Negatives:   Signs/symptoms of thrombosis, Signs/symptoms of bleeding, Laboratory test error suspected, Change in health, Change in alcohol use, Change in activity, Upcoming invasive procedure, Emergency department visit, Upcoming dental procedure, Missed doses, Extra doses, Change in medications, Change in diet/appetite, Hospital admission, Bruising, Other complaints        Spoke with patient today regarding supratherapeutic INR of 3.6.  Patient denies any signs/symptoms of bruising or bleeding or any changes in diet and medications.  Instructed patient to call clinic with any questions or concerns.  Instructed patient to decrease today's dose to 1mg, then decrease weekly warfarin regimen by ~6% as detailed above.  Follow up in 1 weeks, to reduce risk of adverse events related to this high risk medication,  Warfarin.    Zheng France, PharmD

## 2018-10-03 ENCOUNTER — ANTICOAGULATION MONITORING (OUTPATIENT)
Dept: VASCULAR LAB | Facility: MEDICAL CENTER | Age: 78
End: 2018-10-03

## 2018-10-03 DIAGNOSIS — Z79.01 ON CONTINUOUS ORAL ANTICOAGULATION: ICD-10-CM

## 2018-10-03 LAB — INR PPP: 3.2 (ref 2–3.5)

## 2018-10-03 NOTE — PROGRESS NOTES
Anticoagulation Summary  As of 10/3/2018    INR goal:   2.0-3.0   TTR:   16.6 % (11.9 mo)   Today's INR:   3.2!   Warfarin maintenance plan:   2 mg (2 mg x 1) every day   Weekly warfarin total:   14 mg   Plan last modified:   Nikko Moran PharmD (10/3/2018)   Next INR check:   10/10/2018   Target end date:   Indefinite    Indications    Atrial fibrillation (HCC) [I48.91]  Stroke (CMS-HCC) [I63.9] [I63.9]  On continuous oral anticoagulation [Z79.01]             Anticoagulation Episode Summary     INR check location:       Preferred lab:       Send INR reminders to:       Comments:   Dayne [phone]      Anticoagulation Care Providers     Provider Role Specialty Phone number    Gio Garcia M.D. Referring Interventional Cardiology [phone]    Brdaley Ames M.D. Responsible Internal Medicine [phone]    [facility] Anticoagulation Services Responsible  [phone]        Anticoagulation Patient Findings    Left voicemail message to report a SURPA therapeutic INR of 3.2.  Pt to begin the following warfarin regimen:    2 mg (2 mg x 1) every day warfarin PO    Requested pt contact the clinic for any s/s of unusual bleeding, bruising, clotting or any changes to diet or medication. FU 1 week.    Nikko Moran, AngelinaD

## 2018-10-10 ENCOUNTER — ANTICOAGULATION MONITORING (OUTPATIENT)
Dept: VASCULAR LAB | Facility: MEDICAL CENTER | Age: 78
End: 2018-10-10

## 2018-10-10 DIAGNOSIS — Z79.01 ON CONTINUOUS ORAL ANTICOAGULATION: ICD-10-CM

## 2018-10-10 DIAGNOSIS — I48.91 ATRIAL FIBRILLATION, UNSPECIFIED TYPE (HCC): ICD-10-CM

## 2018-10-10 LAB — INR PPP: 4.2 (ref 2–3.5)

## 2018-10-10 NOTE — PROGRESS NOTES
Anticoagulation Summary  As of 10/10/2018    INR goal:   2.0-3.0   TTR:   16.3 % (1 y)   Today's INR:   4.2!   Warfarin maintenance plan:   1 mg (2 mg x 0.5) on Thu; 2 mg (2 mg x 1) all other days   Weekly warfarin total:   13 mg   Plan last modified:   Zheng France, PharmD (10/10/2018)   Next INR check:   10/17/2018   Target end date:   Indefinite    Indications    Atrial fibrillation (HCC) [I48.91]  Stroke (CMS-HCC) [I63.9] [I63.9]  On continuous oral anticoagulation [Z79.01]             Anticoagulation Episode Summary     INR check location:       Preferred lab:       Send INR reminders to:       Comments:   Dayne [phone]      Anticoagulation Care Providers     Provider Role Specialty Phone number    Gio Garcia M.D. Referring Interventional Cardiology [phone]    Bradley Ames M.D. Responsible Internal Medicine [phone]    [facility] Anticoagulation Services Responsible  [phone]        Anticoagulation Patient Findings    Left voicemail message to report a supratherapeutic INR of 4.2.  Requested pt contact the clinic for any s/s of unusual bleeding, bruising, clotting or any changes to diet or medication.     Patient to HOLD today's dose, then decrease weekly warfarin dose to 1mg every Thursday and warfarin 2mg all other days.    FU 1 weeks.  Zheng France, PharmD

## 2018-10-17 ENCOUNTER — ANTICOAGULATION MONITORING (OUTPATIENT)
Dept: VASCULAR LAB | Facility: MEDICAL CENTER | Age: 78
End: 2018-10-17

## 2018-10-17 DIAGNOSIS — I48.91 ATRIAL FIBRILLATION, UNSPECIFIED TYPE (HCC): ICD-10-CM

## 2018-10-17 DIAGNOSIS — Z79.01 ON CONTINUOUS ORAL ANTICOAGULATION: ICD-10-CM

## 2018-10-17 LAB — INR PPP: 2.5 (ref 2–3.5)

## 2018-10-17 NOTE — PROGRESS NOTES
Anticoagulation Summary  As of 10/17/2018    INR goal:   2.0-3.0   TTR:   16.6 % (1 y)   Today's INR:   2.5   Warfarin maintenance plan:   1 mg (2 mg x 0.5) on Mon, Thu; 2 mg (2 mg x 1) all other days   Weekly warfarin total:   12 mg   Plan last modified:   Mariajose Reyes, PharmD (10/17/2018)   Next INR check:   10/24/2018   Target end date:   Indefinite    Indications    Atrial fibrillation (HCC) [I48.91]  Stroke (CMS-HCC) [I63.9] [I63.9]  On continuous oral anticoagulation [Z79.01]             Anticoagulation Episode Summary     INR check location:       Preferred lab:       Send INR reminders to:       Comments:   Dayne [phone]      Anticoagulation Care Providers     Provider Role Specialty Phone number    Gio Garcia M.D. Referring Interventional Cardiology [phone]    Bradley Ames M.D. Responsible Internal Medicine [phone]    [facility] Anticoagulation Services Responsible  [phone]        Anticoagulation Patient Findings      Left voicemail message to report a therapeutic INR of 2.5.    Will have pt start a new reduced warfarin regimen to account for held dose last week.   Requested pt contact the clinic for any s/s of unusual bleeding, bruising, clotting or any changes to diet or medication.    FU INR in 1 week(s) with Dayne NICHOLAS.    Mariajose Reyes, PharmD

## 2018-10-30 ENCOUNTER — ANTICOAGULATION MONITORING (OUTPATIENT)
Dept: VASCULAR LAB | Facility: MEDICAL CENTER | Age: 78
End: 2018-10-30

## 2018-10-30 DIAGNOSIS — I48.91 ATRIAL FIBRILLATION, UNSPECIFIED TYPE (HCC): ICD-10-CM

## 2018-10-30 DIAGNOSIS — Z79.01 ON CONTINUOUS ORAL ANTICOAGULATION: ICD-10-CM

## 2018-10-30 LAB — INR PPP: 2.2 (ref 2–3.5)

## 2018-10-30 NOTE — PROGRESS NOTES
Anticoagulation Summary  As of 10/30/2018    INR goal:   2.0-3.0   TTR:   19.4 % (1 y)   Today's INR:   2.2   Warfarin maintenance plan:   1 mg (2 mg x 0.5) on Mon, Thu; 2 mg (2 mg x 1) all other days   Weekly warfarin total:   12 mg   Plan last modified:   Angelina SinghD (10/17/2018)   Next INR check:   11/13/2018   Target end date:   Indefinite    Indications    Atrial fibrillation (HCC) [I48.91]  Stroke (CMS-HCC) [I63.9] [I63.9]  On continuous oral anticoagulation [Z79.01]             Anticoagulation Episode Summary     INR check location:       Preferred lab:       Send INR reminders to:       Comments:   Dayne [phone]      Anticoagulation Care Providers     Provider Role Specialty Phone number    Gio Garcia M.D. Referring Interventional Cardiology [phone]    Bradley Ames M.D. Responsible Internal Medicine [phone]    [facility] Anticoagulation Services Responsible  [phone]        Anticoagulation Patient Findings    Left voicemail message to report a therapeutic INR of 2.2.  Pt to continue with current warfarin dosing regimen. Requested pt contact the clinic for any s/s of unusual bleeding, bruising, clotting or any changes to diet or medication. FU 2 weeks.  Nikko Moran, PharmD

## 2018-11-13 ENCOUNTER — ANTICOAGULATION MONITORING (OUTPATIENT)
Dept: VASCULAR LAB | Facility: MEDICAL CENTER | Age: 78
End: 2018-11-13

## 2018-11-13 DIAGNOSIS — I48.91 ATRIAL FIBRILLATION, UNSPECIFIED TYPE (HCC): ICD-10-CM

## 2018-11-13 DIAGNOSIS — Z79.01 ON CONTINUOUS ORAL ANTICOAGULATION: ICD-10-CM

## 2018-11-13 LAB — INR PPP: 1.7 (ref 2–3.5)

## 2018-11-13 NOTE — PROGRESS NOTES
Anticoagulation Summary  As of 11/13/2018    INR goal:   2.0-3.0   TTR:   20.1 % (1.1 y)   Today's INR:   1.7!   Warfarin maintenance plan:   1 mg (2 mg x 0.5) on Mon, Thu; 2 mg (2 mg x 1) all other days   Weekly warfarin total:   12 mg   Plan last modified:   Angelina SinghD (10/17/2018)   Next INR check:   11/27/2018   Target end date:   Indefinite    Indications    Atrial fibrillation (HCC) [I48.91]  Stroke (CMS-HCC) [I63.9] [I63.9]  On continuous oral anticoagulation [Z79.01]             Anticoagulation Episode Summary     INR check location:       Preferred lab:       Send INR reminders to:       Comments:   Dayne [phone]      Anticoagulation Care Providers     Provider Role Specialty Phone number    Gio Garcia M.D. Referring Interventional Cardiology [phone]    Bradley Ames M.D. Responsible Internal Medicine [phone]    [facility] Anticoagulation Services Responsible  [phone]        Anticoagulation Patient Findings    Left a message on the patient's voicemail today, informing the patient of a SUB-therapeutic INR of 1.7.  Instructed patient to call the clinic with any changes to diet or medications, with any signs/sx of bleeding or clotting or with any questions or concerns.     Patient instructed to increase dose to 4mg TONIGHT ONLY, then to resume her current dosing regimen. Patient asked to follow up again in 2 weeks.     Dustin Fisher PharmD

## 2019-01-08 ENCOUNTER — TELEPHONE (OUTPATIENT)
Dept: VASCULAR LAB | Facility: MEDICAL CENTER | Age: 79
End: 2019-01-08

## 2019-01-08 NOTE — TELEPHONE ENCOUNTER
Attempted calling thrice to remind them they are overdue for INR check. Voicemail not set up.    Clive West, pharmacy intern.

## 2019-01-08 NOTE — LETTER
January 8, 2019        [facility]  [address]  [place] [address]      Good Morning,     This is just a friendly reminder that you are overdue for you next INR blood test for the warfarin.     INR       Date                     Value               Ref Range           Status                11/13/2018               1.7                                     Final                This is the last result we have recorded for you, please have this test completed as soon as possible.   Any questions or concerns please contact us at [phone].    Thanks,   Mariajose Reyes

## 2019-02-13 ENCOUNTER — TELEPHONE (OUTPATIENT)
Dept: VASCULAR LAB | Facility: MEDICAL CENTER | Age: 79
End: 2019-02-13

## 2019-02-13 NOTE — LETTER
February 13, 2019        [facility]  [address] [place] [address]    We have been unsuccessful in our attempts to contact you regarding your Anticoagulation Service appointments.  Warfarin is a potent blood-thinning agent that requires frequent monitoring to measure its level in the blood.  If your level becomes too high, you could develop serious, sometimes life-threatening bleeding problems.  If your level becomes too low, life-threatening blood clots or stroke could result.    It is extremely important that you have your lab work drawn as soon as possible.  Alternatively, you may schedule an appointment for a fingerstick INR at our clinic.  We are open Monday-Friday 8 am until 5 pm.  You may reach our Service at [phone].    To monitor your warfarin level effectively, we need to be able to communicate with you.  This is a requirement to be followed by our Service.  If we are unable to contact you on three separate occasions, you will be discharged from the Anticoagulation Service.    If you repeatedly fail to keep your lab appointments, you are at risk of being discharged from the Service.        Sincerely,        Omero Martinez, PharmD, [facility]  Pharmacy Clinical Supervisor  [facility]  Outpatient Anticoagulation Service

## 2019-03-13 ENCOUNTER — TELEPHONE (OUTPATIENT)
Dept: VASCULAR LAB | Facility: MEDICAL CENTER | Age: 79
End: 2019-03-13

## 2019-03-13 NOTE — TELEPHONE ENCOUNTER
INR   Date Value Ref Range Status   11/13/2018 1.7  Final     POC INR   Date Value Ref Range Status   12/12/2017 6.9 (A) 0.9 - 1.2 Final     Comment:     lot# [id number] exp 10/31/18 NDC [address]-67 QC Pass       Unable to contact via phone, rings and rings, no VM.   Sent letter for INR reminder    Mariajose Reyes, AngelinaD

## 2019-03-13 NOTE — LETTER
March 13, 2019        [facility]  [address] [place] [address]    We have been unsuccessful in our attempts to contact you regarding your Anticoagulation Service appointments.  Warfarin is a potent blood-thinning agent that requires frequent monitoring to measure its level in the blood.  If your level becomes too high, you could develop serious, sometimes life-threatening bleeding problems.  If your level becomes too low, life-threatening blood clots or stroke could result.    It is extremely important that you have your lab work drawn as soon as possible.  Alternatively, you may schedule an appointment for a fingerstick INR at our clinic.  We are open Monday-Friday 8 am until 5 pm.  You may reach our Service at [phone].    To monitor your warfarin level effectively, we need to be able to communicate with you.  This is a requirement to be followed by our Service.  If we are unable to contact you on three separate occasions, you will be discharged from the Anticoagulation Service.    If you repeatedly fail to keep your lab appointments, you are at risk of being discharged from the Service.        Sincerely,        Omero Martinez, PharmD, [facility]  Pharmacy Clinical Supervisor  [facility]  Outpatient Anticoagulation Service

## 2019-04-05 ENCOUNTER — TELEPHONE (OUTPATIENT)
Dept: VASCULAR LAB | Facility: MEDICAL CENTER | Age: 79
End: 2019-04-05

## 2019-05-03 ENCOUNTER — TELEPHONE (OUTPATIENT)
Dept: VASCULAR LAB | Facility: MEDICAL CENTER | Age: 79
End: 2019-05-03

## 2019-05-08 ENCOUNTER — ANTICOAGULATION MONITORING (OUTPATIENT)
Dept: VASCULAR LAB | Facility: MEDICAL CENTER | Age: 79
End: 2019-05-08

## 2019-05-08 DIAGNOSIS — Z79.01 ON CONTINUOUS ORAL ANTICOAGULATION: ICD-10-CM

## 2019-05-08 NOTE — PROGRESS NOTES
DC from Anticoagulation clinic due to noncompliance.   FYI to referring MD. Mariajose Reyes, PharmD